# Patient Record
Sex: FEMALE | Race: BLACK OR AFRICAN AMERICAN | NOT HISPANIC OR LATINO | URBAN - METROPOLITAN AREA
[De-identification: names, ages, dates, MRNs, and addresses within clinical notes are randomized per-mention and may not be internally consistent; named-entity substitution may affect disease eponyms.]

---

## 2024-04-03 ENCOUNTER — OV NP (OUTPATIENT)
Dept: URBAN - METROPOLITAN AREA CLINIC 25 | Facility: CLINIC | Age: 69
End: 2024-04-03
Payer: MEDICARE

## 2024-04-03 VITALS
DIASTOLIC BLOOD PRESSURE: 86 MMHG | HEIGHT: 61 IN | BODY MASS INDEX: 22.28 KG/M2 | WEIGHT: 118 LBS | SYSTOLIC BLOOD PRESSURE: 156 MMHG | TEMPERATURE: 97.1 F | HEART RATE: 80 BPM

## 2024-04-03 DIAGNOSIS — Z12.11 COLON CANCER SCREENING: ICD-10-CM

## 2024-04-03 PROCEDURE — 99202 OFFICE O/P NEW SF 15 MIN: CPT

## 2024-04-03 RX ORDER — POLYETHYLENE GLYCOL 3350, SODIUM SULFATE ANHYDROUS, SODIUM BICARBONATE, SODIUM CHLORIDE, POTASSIUM CHLORIDE 236; 22.74; 6.74; 5.86; 2.97 G/4L; G/4L; G/4L; G/4L; G/4L
AS DIRECTED POWDER, FOR SOLUTION ORAL
Qty: 1 | Refills: 0 | OUTPATIENT
Start: 2024-04-03 | End: 2024-04-04

## 2024-04-03 NOTE — HPI-TODAY'S VISIT:
Ms. Raines is a 68y F, she  presents to the clinic for colon cancer screening  No change in bowel habits, nod stools or straining, however, notes decreased frequency. last BM 3-4 days ago  No change in weight No change in appetite No n/v No BRBPR, no Melena No abdominal pain No SOB, notes mild CP, recent cardiac work up w/ EKG w/ pcp normal  Last colonoscopy: 10 or more years ago, no polyps  No known family h/o colon cancer/polyps. Denies cardiac hardware, dialysis, blood thinner use, and or issues with anesthesia

## 2024-04-08 ENCOUNTER — COLON (OUTPATIENT)
Dept: URBAN - METROPOLITAN AREA SURGERY CENTER 20 | Facility: SURGERY CENTER | Age: 69
End: 2024-04-08
Payer: MEDICARE

## 2024-04-08 DIAGNOSIS — Z12.11 COLON CANCER SCREENING: ICD-10-CM

## 2024-04-08 PROCEDURE — G0121 COLON CA SCRN NOT HI RSK IND: HCPCS | Performed by: INTERNAL MEDICINE
